# Patient Record
Sex: FEMALE | Race: WHITE | URBAN - METROPOLITAN AREA
[De-identification: names, ages, dates, MRNs, and addresses within clinical notes are randomized per-mention and may not be internally consistent; named-entity substitution may affect disease eponyms.]

---

## 2024-06-18 ENCOUNTER — TELEPHONE (OUTPATIENT)
Dept: PEDIATRICS | Facility: CLINIC | Age: 49
End: 2024-06-18

## 2024-06-19 NOTE — TELEPHONE ENCOUNTER
"RN attempted to reach patient. Phone number \"not in service\".    RN called and spoke with [facility] to inform that we do not prescribe medication, have not ever seen this patient, not able to get a hold of patient. Verbalized understanding.      Cynthia TORIBIO RN on 6/19/2024 at 12:18 PM     "